# Patient Record
Sex: FEMALE | Race: WHITE | NOT HISPANIC OR LATINO | URBAN - METROPOLITAN AREA
[De-identification: names, ages, dates, MRNs, and addresses within clinical notes are randomized per-mention and may not be internally consistent; named-entity substitution may affect disease eponyms.]

---

## 2023-04-12 ENCOUNTER — APPOINTMENT (RX ONLY)
Dept: URBAN - METROPOLITAN AREA CLINIC 146 | Facility: CLINIC | Age: 73
Setting detail: DERMATOLOGY
End: 2023-04-12

## 2023-04-12 DIAGNOSIS — Z41.1 ENCOUNTER FOR COSMETIC SURGERY: ICD-10-CM

## 2023-04-12 PROCEDURE — ? CONSULTATION - FACELIFT

## 2024-10-11 ENCOUNTER — APPOINTMENT (RX ONLY)
Dept: URBAN - METROPOLITAN AREA CLINIC 102 | Facility: CLINIC | Age: 74
Setting detail: DERMATOLOGY
End: 2024-10-11

## 2024-10-11 DIAGNOSIS — L82.0 INFLAMED SEBORRHEIC KERATOSIS: ICD-10-CM

## 2024-10-11 PROCEDURE — ? COUNSELING

## 2024-10-11 PROCEDURE — 17110 DESTRUCTION B9 LES UP TO 14: CPT

## 2024-10-11 PROCEDURE — ? LIQUID NITROGEN

## 2024-10-11 ASSESSMENT — LOCATION ZONE DERM
LOCATION ZONE: FACE
LOCATION ZONE: LEG
LOCATION ZONE: ARM
LOCATION ZONE: TRUNK

## 2024-10-11 ASSESSMENT — LOCATION SIMPLE DESCRIPTION DERM
LOCATION SIMPLE: LEFT BREAST
LOCATION SIMPLE: LEFT PRETIBIAL REGION
LOCATION SIMPLE: LEFT FOREHEAD
LOCATION SIMPLE: LEFT KNEE
LOCATION SIMPLE: RIGHT BREAST
LOCATION SIMPLE: LEFT UPPER ARM
LOCATION SIMPLE: RIGHT PRETIBIAL REGION

## 2024-10-11 ASSESSMENT — LOCATION DETAILED DESCRIPTION DERM
LOCATION DETAILED: LEFT MEDIAL BREAST 10-11:00 REGION
LOCATION DETAILED: LEFT FOREHEAD
LOCATION DETAILED: LEFT ANTERIOR DISTAL UPPER ARM
LOCATION DETAILED: RIGHT MEDIAL BREAST 2-3:00 REGION
LOCATION DETAILED: LEFT INFRAMAMMARY CREASE (INNER QUADRANT)
LOCATION DETAILED: RIGHT PROXIMAL PRETIBIAL REGION
LOCATION DETAILED: LEFT KNEE
LOCATION DETAILED: LEFT MEDIAL PROXIMAL PRETIBIAL REGION

## 2024-10-11 NOTE — PROCEDURE: LIQUID NITROGEN
Render Note In Bullet Format When Appropriate: No
Spray Paint Text: The liquid nitrogen was applied to the skin utilizing a spray paint frosting technique.
Consent: The patient's or parent’s consent was obtained and risks and benefits were reviewed.
Medical Necessity Clause: This procedure was medically necessary because the lesions that were treated were:
Post-Care Instructions: I reviewed with the patient in detail post-care instructions. Patient is to wear sunprotection, and avoid picking at any of the treated lesions. Pt may apply Vaseline to crusted or scabbing areas.
Show Topical Anesthesia Variable?: Yes
Medical Necessity Information: It is in your best interest to select a reason for this procedure from the list below. All of these items fulfill various CMS LCD requirements except the new and changing color options.
Detail Level: Detailed

## 2025-05-29 ENCOUNTER — APPOINTMENT (OUTPATIENT)
Dept: URBAN - METROPOLITAN AREA CLINIC 101 | Facility: CLINIC | Age: 75
Setting detail: DERMATOLOGY
End: 2025-05-29

## 2025-05-29 VITALS — HEIGHT: 67 IN | WEIGHT: 130 LBS

## 2025-05-29 DIAGNOSIS — Z01.818 ENCOUNTER FOR OTHER PREPROCEDURAL EXAMINATION: ICD-10-CM

## 2025-05-29 PROCEDURE — ? ADDITIONAL NOTES

## 2025-05-29 PROCEDURE — ? DVT RISK ASSESSMENT

## 2025-05-29 PROCEDURE — ?

## 2025-05-29 NOTE — PROCEDURE: ADDITIONAL NOTES
Additional Notes: Patient's concerns include: \\n-  Desire to improve her superior pole hollowing\\n- Unhappy with her NAC asymmetry in both shape and position\\n- Dislikes her lateral chest skin excess \\n- Desires to slightly decrease her breast volume \\n- Refuses any breast imaging prior to surgery \\n- Undecided whether she wants fat grafting vs small implants to improve her superior pole hollowing\\n\\n\\n\\nFactors altering surgical decisions (hx/exam findings): \\n-  Bilateral breast ptosis with loss of superior pole fullness\\n- Hx of breast surgery x3 (initial augmentation ~30 years ago then bilateral breast explant surgery then J-mastopexy about 10 years ago)\\n-  No recent mammogram for the past 10 years.\\n- s/p necklift surgery in May 2025 at outside facility \\n\\n\\nProposed intervention(s):\\n- Must wait until at least 3months out from her recent neck lift before proceeding with breast surgery\\n-  Recommend revision mastopexy to improve areola asymmetry including lateral chest skin laxity excision bilaterally and either breast augmentation with submuscular placement of a small silicone implant vs breast fat grafting for improving her superior pole fulness. If patient decides to proceed with fat grafting then the sites for fat harvest will need to be decided. Plan to revise her mastopexy and improve areola asymmetry but no plans to significantly elevate the NAC. After discussing different types of implants, the patient selected the Natrelle SoftTouch silicone implants. Discussed limitations of surgery including postoperative tissue relaxation and impact of breast lift on final implant volume selected, which the patient understands. Recommend that the patient provide breast goal photos prior to surgery although did discuss that final results are not guaranteed to match the provided photos and results vary based on patient anatomy. The patient will need medical clearance with labs prior to surgery. A mammogram is recommended, which the patient refuses so she will need to sign the mammogram refusal form. \\n- Discussed revision mastopexy with either augmentation vs fat grafting surgery including the incision patterns in detail. Discussed possibility of requiring a revision in the future and the impact of different factors like weight fluctuation on the final surgical results. \\n- Discussed postop care including 6 weeks of activity restrictions and use of surgical bra. Discussed risks of revision mastopexy with possible augmentation surgery including infection, bleeding, scar, damage to nearby structures, need for future surgery, hematoma, seroma, asymmetry, loss of NAC, sensory change of NAC, skin necrosis, delayed wound healing, inability to breast feed, recurrent ptosis, no guarantee of cup size, pain, poor scarring, and poor cosmetic result. Discussed use of implants (silicone vs saline), and related complications including malposition, capsular contracture, implant failure or deflation, breast implant illness, toxic shock syndrome, implant related cancer including ALCL, likelihood of requiring future surgery since implants are not lifelong devices, and rippling. Patient will be seen on POD#1 for a hematoma check and to be placed into a bra. Discussed that final surgical results are not as predictable when the breast augmentation and mastopexy are performed together compared to staging the procedures.\\n- Discussed breast fat grafting procedure in detail including variable amount of fat survival with possible need for repeat fat grafting in the future, limitations in the amount of breast enlargement achievable with fat grafting alone making it best suited for patients wanting a slight increase in breast volume or increased superior pole fullness, and postop weight change can affect the shape and volume of the breasts. Expect to lose about 30-40% of the transferred fat with final volume stabilizing at 3-4 months postop as long as weight remains stable. Discussed postop care including 3-6 weeks of activity restrictions and use of surgical bra as well as compression garment for donor site x4-6weeks. Discussed avoiding direct pressure on breasts after surgery. Discussed risks of breast fat grafting and scar revision including infection, bleeding, scar, damage to nearby structures, need for future surgery, hematoma, seroma, asymmetry, contour irregularity, skin necrosis, delayed wound healing, no guarantee of cup size, fat necrosis, fat calcifications, granulomas, fat cyst formation, fat embolism, fat loss, blood clot, pain, and poor cosmetic result.\\n- Discussed DVT prevention with use of SCDs and early postop ambulation\\n- Discussed postop pain control including optional use of Exparel, which is a long acting local anesthetic injected at the time of surgery\\n- The patient was provided with pricing info for the procedures discussed as well as Exparel. \\n- Updated mammogram and medical clearance with labs needed prior to surgery and also request that the patient provide breast goal photos to assist with surgical planning. Patient refuses a mammogram then she will need to sign the mammogram refusal form\\n\\n\\n\\n\\nPre-op Counseling:\\n- Aesthetic Breast Deformity Care: Breast cosmetic dissatisfaction may be due to volume inadequacy, volume excess, breast descent (sagging), excess skin, nipple distortion, scarring, asymmetry, and other complaints. Breast aesthetics are the result of complex interplay between the skin envelope, the nipple position, the volume and projection of the breasts, the chest wall anatomy, age, hormonal status, and other factors. Clear articulation of patient aesthetic goals is important to help the surgeon meet patient expectations. Photos of desired results may be informative but can not be fully relied upon as a guide for surgery. No guarantee has been made or implied regarding matching final results to photographic examples or specific cup sizes. Cosmetic breast surgery is commonly performed on an outpatient basis, although overnight hospitalization may be indicated in some patients, particularly those undergoing large body contouring operations. Breast enlargement may improve somewhat with diet control, exercise, and proper care, including adequate supportive breast garments, avoidance of excess sun to the breast skin, and abstinence from nicotine. Specific preoperative and postoperative instructions will be provided for surgery. Breast implants are commonly used for enhancement of the breast. Risks associated with implant use are enumerated in the specific counseling blocks below. Fat transfer to the breast area is commonly done by combining liposuction of problem areas with injection of the suctioned fat into the breasts to fill contour defects, or to create a varghese, rounder look. Irregularity, asymmetry, and loss of volume in augmented areas may still occur, and aging changes in the breast will continue as the patient gets older. \\n- Expectations: Breast surgery is typically done under general anesthesia, although some patients may undergo surgery under sedation with local anesthesia. Healing typically takes anywhere between 3 to 6 weeks for incisions and several more weeks are often required for settling of the breasts. Scars may mature over the course of 6-12 months.
Render Risk Assessment In Note?: no